# Patient Record
Sex: FEMALE | Race: WHITE | NOT HISPANIC OR LATINO | Employment: OTHER | URBAN - METROPOLITAN AREA
[De-identification: names, ages, dates, MRNs, and addresses within clinical notes are randomized per-mention and may not be internally consistent; named-entity substitution may affect disease eponyms.]

---

## 2017-04-28 DIAGNOSIS — Z00.00 ROUTINE GENERAL MEDICAL EXAMINATION AT A HEALTH CARE FACILITY: Primary | ICD-10-CM

## 2017-07-06 ENCOUNTER — PATIENT OUTREACH (OUTPATIENT)
Dept: ADMINISTRATIVE | Facility: HOSPITAL | Age: 62
End: 2017-07-06

## 2017-07-06 NOTE — LETTER
July 6, 2017    Sidra Matamoros  [id number] [place] [address]             [facility]  [address]  [place] [address]  Phone: [phone] Dear Mrs. Matamoros:    [facility] is committed to your overall health.  To help you get the most out of each of your visits, we will review your information to make sure you are up to date on all of your recommended tests and/or procedures.      Ross Davis MD has found that you may be due for:   Health Maintenance Due   Topic    DEXA Scan and    Hemoglobin A1c     Eye Exam and Foot Exam    PAP Smear    Lipid Panel      Mammogram      If you have had any of the above done at another facility, please bring the records or information with you so that your record at [facility] will be complete.  If you are currently taking medication, please bring it with you to your appointment for review.  We will be happy to assist you with scheduling any necessary appointments or you may contact the [facility] appointment desk at [phone] to schedule at your convenience.   Thank you for choosing [facility] for your healthcare needs,    If you have any questions or concerns, please don't hesitate to call.    Sincerely,  Ginger BURGER LPN  Care Coordination Department  [facility]

## 2017-07-20 ENCOUNTER — CLINICAL SUPPORT (OUTPATIENT)
Dept: INTERNAL MEDICINE | Facility: CLINIC | Age: 62
End: 2017-07-20

## 2017-07-20 ENCOUNTER — OFFICE VISIT (OUTPATIENT)
Dept: INTERNAL MEDICINE | Facility: CLINIC | Age: 62
End: 2017-07-20
Payer: COMMERCIAL

## 2017-07-20 ENCOUNTER — CLINICAL SUPPORT (OUTPATIENT)
Dept: CARDIOLOGY | Facility: CLINIC | Age: 62
End: 2017-07-20
Payer: COMMERCIAL

## 2017-07-20 VITALS
RESPIRATION RATE: 16 BRPM | DIASTOLIC BLOOD PRESSURE: 68 MMHG | HEART RATE: 76 BPM | HEIGHT: 68 IN | RESPIRATION RATE: 16 BRPM | TEMPERATURE: 97 F | BODY MASS INDEX: 25.07 KG/M2 | HEART RATE: 76 BPM | DIASTOLIC BLOOD PRESSURE: 68 MMHG | BODY MASS INDEX: 25.07 KG/M2 | WEIGHT: 165.38 LBS | SYSTOLIC BLOOD PRESSURE: 112 MMHG | WEIGHT: 165.38 LBS | HEIGHT: 68 IN | OXYGEN SATURATION: 99 % | SYSTOLIC BLOOD PRESSURE: 112 MMHG

## 2017-07-20 DIAGNOSIS — Z00.00 ROUTINE GENERAL MEDICAL EXAMINATION AT A HEALTH CARE FACILITY: ICD-10-CM

## 2017-07-20 DIAGNOSIS — E78.5 HYPERLIPIDEMIA, UNSPECIFIED HYPERLIPIDEMIA TYPE: ICD-10-CM

## 2017-07-20 DIAGNOSIS — E11.9 TYPE 2 DIABETES MELLITUS WITHOUT COMPLICATION, WITHOUT LONG-TERM CURRENT USE OF INSULIN: ICD-10-CM

## 2017-07-20 DIAGNOSIS — Z00.00 ROUTINE GENERAL MEDICAL EXAMINATION AT A HEALTH CARE FACILITY: Primary | ICD-10-CM

## 2017-07-20 DIAGNOSIS — F32.A ANXIETY AND DEPRESSION: ICD-10-CM

## 2017-07-20 DIAGNOSIS — I10 ESSENTIAL HYPERTENSION: ICD-10-CM

## 2017-07-20 DIAGNOSIS — F33.2 MAJOR DEPRESSIVE DISORDER, RECURRENT, SEVERE WITHOUT PSYCHOTIC FEATURES: ICD-10-CM

## 2017-07-20 DIAGNOSIS — I34.1 MVP (MITRAL VALVE PROLAPSE): ICD-10-CM

## 2017-07-20 DIAGNOSIS — F41.9 ANXIETY AND DEPRESSION: ICD-10-CM

## 2017-07-20 LAB
ALBUMIN SERPL BCP-MCNC: 3.9 G/DL
ALP SERPL-CCNC: 43 U/L
ALT SERPL W/O P-5'-P-CCNC: 22 U/L
ANION GAP SERPL CALC-SCNC: 10 MMOL/L
AST SERPL-CCNC: 20 U/L
BILIRUB SERPL-MCNC: 0.4 MG/DL
BUN SERPL-MCNC: 20 MG/DL
CALCIUM SERPL-MCNC: 10.4 MG/DL
CHLORIDE SERPL-SCNC: 107 MMOL/L
CHOLEST/HDLC SERPL: 3 {RATIO}
CO2 SERPL-SCNC: 27 MMOL/L
CREAT SERPL-MCNC: 0.9 MG/DL
ERYTHROCYTE [DISTWIDTH] IN BLOOD BY AUTOMATED COUNT: 13.3 %
EST. GFR  (AFRICAN AMERICAN): >60 ML/MIN/1.73 M^2
EST. GFR  (NON AFRICAN AMERICAN): >60 ML/MIN/1.73 M^2
GLUCOSE SERPL-MCNC: 105 MG/DL
HCT VFR BLD AUTO: 36.8 %
HDL/CHOLESTEROL RATIO: 33.3 %
HDLC SERPL-MCNC: 153 MG/DL
HDLC SERPL-MCNC: 51 MG/DL
HGB BLD-MCNC: 11.9 G/DL
LDLC SERPL CALC-MCNC: 79.8 MG/DL
MCH RBC QN AUTO: 26.6 PG
MCHC RBC AUTO-ENTMCNC: 32.3 G/DL
MCV RBC AUTO: 82 FL
NONHDLC SERPL-MCNC: 102 MG/DL
PLATELET # BLD AUTO: 303 K/UL
PMV BLD AUTO: 9.4 FL
POTASSIUM SERPL-SCNC: 4.7 MMOL/L
PROT SERPL-MCNC: 7 G/DL
RBC # BLD AUTO: 4.48 M/UL
SODIUM SERPL-SCNC: 144 MMOL/L
TRIGL SERPL-MCNC: 111 MG/DL
TSH SERPL DL<=0.005 MIU/L-ACNC: 1.38 UIU/ML
WBC # BLD AUTO: 6.02 K/UL

## 2017-07-20 PROCEDURE — 99999 PR PBB SHADOW E&M-EST. PATIENT-LVL III: CPT | Mod: PBBFAC,,, | Performed by: INTERNAL MEDICINE

## 2017-07-20 PROCEDURE — 99386 PREV VISIT NEW AGE 40-64: CPT | Mod: S$GLB,,, | Performed by: INTERNAL MEDICINE

## 2017-07-20 PROCEDURE — 84443 ASSAY THYROID STIM HORMONE: CPT

## 2017-07-20 PROCEDURE — 80053 COMPREHEN METABOLIC PANEL: CPT | Mod: PO

## 2017-07-20 PROCEDURE — 93000 ELECTROCARDIOGRAM COMPLETE: CPT | Mod: S$GLB,,, | Performed by: NUCLEAR MEDICINE

## 2017-07-20 PROCEDURE — 83036 HEMOGLOBIN GLYCOSYLATED A1C: CPT

## 2017-07-20 PROCEDURE — 80061 LIPID PANEL: CPT | Mod: PO

## 2017-07-20 PROCEDURE — 85027 COMPLETE CBC AUTOMATED: CPT | Mod: PO

## 2017-07-20 RX ORDER — ACYCLOVIR 400 MG/1
TABLET ORAL
COMMUNITY
Start: 2017-05-23

## 2017-07-20 RX ORDER — FLUCONAZOLE 150 MG/1
150 TABLET ORAL
COMMUNITY
Start: 2017-06-19 | End: 2017-07-20

## 2017-07-20 RX ORDER — ESTRADIOL 0.03 MG/D
1 FILM, EXTENDED RELEASE TRANSDERMAL WEEKLY
COMMUNITY
End: 2019-03-27

## 2017-07-20 NOTE — PROGRESS NOTES
Subjective:      Patient ID: Sidra Matamoros is a 61 y.o. female.    Chief Complaint: Executive Health    It was a pleasure to meet you for your  Executive Health Physical on 17.      PAST MEDICAL HISTORY:  Osteoarthritis of knees, mitral  valve prolapse, microscopic hematuria with negative  workup, insomnia, hypertension, hyperlipidemia,  diabetes type 2, colon polyps, anxiety, and depression.    Denied smoking, alcohol  Retired from [employer],  with two  Healthy children.      Your primary care  physician is Dr. Song Butt.     Dr. Armando Hood, endocrinologist.  lipidologist [facility].          Medications    acyclovir (ZOVIRAX) 400 MG tablet        duloxetine (CYMBALTA) 30 MG capsule 30 mg, Daily       estradiol (VIVELLE-DOT) 0.025 mg/24 hr 1 patch, Weekly       ezetimibe (ZETIA) 10 mg tablet 10 mg, Daily       fenofibrate micronized (ANTARA) 130 MG capsule 130 mg, With breakfast       fluconazole (DIFLUCAN) 150 MG Tab 150 mg       lisinopril (PRINIVIL,ZESTRIL) 20 MG tablet 20 mg, Daily       metformin (GLUCOPHAGE-XR) 500 MG 24 hr tablet 500 mg, Daily       trazodone (DESYREL) 50 MG tablet 50 mg, Nightly       Review of patient's allergies indicates:   -- Niacin preparations -- Other (See Comments)    --  Skipping heart beat   -- Azithromycin -- Other (See Comments)    --  Hallicunations   -- Seldane -- Other (See Comments)    --  Hallucinations   -- Statins-hmg-coa reductase inhibitors      SURGERIES:  Include total abdominal hysterectomy for  endometriosis, sinus surgery, right middle finger  surgery, hemorrhoid surgery, D and C of uterus, right  carpal tunnel release, and appendectomy.     FAMILY HISTORY:   Mom  with alzheimer.   Father had history  of diabetes, heart disease, hyperlipidemia, and  hypertension.  Sister with history of diabetes and  breast cancer at age 56 and another sister has COPD.   Brother  at age 65 from colon cancer.     Prevent:   Prevent:  "last colonoscopy 3 years ago in [place] with Dr. Horner and will check when due for repeat.  Reported up to date with gyn/mmg. Declined shingles and pneumonia vaccines.          Review of Systems   Constitutional: Negative for chills and fever.   HENT: Negative for ear pain and sore throat.    Respiratory: Negative for cough.    Cardiovascular: Negative for chest pain.   Gastrointestinal: Negative for abdominal pain.     Objective:   /68 (BP Location: Right arm, Patient Position: Sitting)   Pulse 76   Temp 97.3 °F (36.3 °C) (Tympanic)   Resp 16   Ht 5' 8" (1.727 m)   Wt 75 kg (165 lb 5.5 oz)   SpO2 99%   BMI 25.14 kg/m²     Physical Exam   Constitutional: She is oriented to person, place, and time. She appears well-developed and well-nourished. No distress.   HENT:   Head: Normocephalic and atraumatic.   Mouth/Throat: Oropharynx is clear and moist.   Eyes: EOM are normal. Pupils are equal, round, and reactive to light.   Neck: Neck supple. No thyromegaly present.   Cardiovascular: Normal rate and regular rhythm.    Pulmonary/Chest: Breath sounds normal. She has no wheezes. She has no rales.   Abdominal: There is no tenderness.   Lymphadenopathy:     She has no cervical adenopathy.   Neurological: She is alert and oriented to person, place, and time.   Skin: Skin is warm and dry.   Psychiatric: She has a normal mood and affect. Her behavior is normal.     Clinical Support on 07/20/2017   Component Date Value Ref Range Status    Sodium 07/20/2017 144  136 - 145 mmol/L Final    Potassium 07/20/2017 4.7  3.5 - 5.1 mmol/L Final    Chloride 07/20/2017 107  95 - 110 mmol/L Final    CO2 07/20/2017 27  23 - 29 mmol/L Final    Glucose 07/20/2017 105  70 - 110 mg/dL Final    BUN, Bld 07/20/2017 20  6 - 20 mg/dL Final    Creatinine 07/20/2017 0.9  0.5 - 1.4 mg/dL Final    Calcium 07/20/2017 10.4  8.7 - 10.5 mg/dL Final    Total Protein 07/20/2017 7.0  6.0 - 8.4 g/dL Final    Albumin 07/20/2017 3.9  3.5 - " 5.2 g/dL Final    Total Bilirubin 07/20/2017 0.4  0.1 - 1.0 mg/dL Final    Comment: For infants and newborns, interpretation of results should be based  on gestational age, weight and in agreement with clinical  observations.  Premature Infant recommended reference ranges:  Up to 24 hours.............<8.0 mg/dL  Up to 48 hours............<12.0 mg/dL  3-5 days..................<15.0 mg/dL  6-29 days.................<15.0 mg/dL      Alkaline Phosphatase 07/20/2017 43* 55 - 135 U/L Final    AST 07/20/2017 20  10 - 40 U/L Final    ALT 07/20/2017 22  10 - 44 U/L Final    Anion Gap 07/20/2017 10  8 - 16 mmol/L Final    eGFR if  07/20/2017 >60  >60 mL/min/1.73 m^2 Final    eGFR if non African American 07/20/2017 >60  >60 mL/min/1.73 m^2 Final    Comment: Calculation used to obtain the estimated glomerular filtration  rate (eGFR) is the CKD-EPI equation. Since race is unknown   in our information system, the eGFR values for   -American and Non--American patients are given   for each creatinine result.      WBC 07/20/2017 6.02  3.90 - 12.70 K/uL Final    RBC 07/20/2017 4.48  4.00 - 5.40 M/uL Final    Hemoglobin 07/20/2017 11.9* 12.0 - 16.0 g/dL Final    Hematocrit 07/20/2017 36.8* 37.0 - 48.5 % Final    MCV 07/20/2017 82  82 - 98 fL Final    MCH 07/20/2017 26.6* 27.0 - 31.0 pg Final    MCHC 07/20/2017 32.3  32.0 - 36.0 g/dL Final    RDW 07/20/2017 13.3  11.5 - 14.5 % Final    Platelets 07/20/2017 303  150 - 350 K/uL Final    MPV 07/20/2017 9.4  9.2 - 12.9 fL Final    Cholesterol 07/20/2017 153  120 - 199 mg/dL Final    Comment: The National Cholesterol Education Program (NCEP) has set the  following guidelines (reference ranges) for Cholesterol:  Optimal.....................<200 mg/dL  Borderline High.............200-239 mg/dL  High........................> or = 240 mg/dL      Triglycerides 07/20/2017 111  30 - 150 mg/dL Final    Comment: The National Cholesterol Education  Program (NCEP) has set the  following guidelines (reference values) for triglycerides:  Normal......................<150 mg/dL  Borderline High.............150-199 mg/dL  High........................200-499 mg/dL      HDL 07/20/2017 51  40 - 75 mg/dL Final    Comment: The National Cholesterol Education Program (NCEP) has set the  following guidelines (reference values) for HDL Cholesterol:  Low...............<40 mg/dL  Optimal...........>60 mg/dL      LDL Cholesterol 07/20/2017 79.8  63.0 - 159.0 mg/dL Final    Comment: The National Cholesterol Education Program (NCEP) has set the  following guidelines (reference values) for LDL Cholesterol:  Optimal.......................<130 mg/dL  Borderline High...............130-159 mg/dL  High..........................160-189 mg/dL  Very High.....................>190 mg/dL      HDL/Chol Ratio 07/20/2017 33.3  20.0 - 50.0 % Final    Total Cholesterol/HDL Ratio 07/20/2017 3.0  2.0 - 5.0 Final    Non-HDL Cholesterol 07/20/2017 102  mg/dL Final    Comment: Risk category and Non-HDL cholesterol goals:  Coronary heart disease (CHD)or equivalent (10-year risk of CHD >20%):  Non-HDL cholesterol goal     <130 mg/dL  Two or more CHD risk factors and 10-year risk of CHD <= 20%:  Non-HDL cholesterol goal     <160 mg/dL  0 to 1 CHD risk factor:  Non-HDL cholesterol goal     <190 mg/dL         Assessment:     1. Routine general medical examination at a health care facility    2. Type 2 diabetes mellitus without complication, without long-term current use of insulin    3. Essential hypertension    4. Hyperlipidemia, unspecified hyperlipidemia type    5. Anxiety and depression    6. MVP (mitral valve prolapse)    7. Major depressive disorder, recurrent, severe without psychotic features      Plan:   Routine general medical examination at a health care facility    Type 2 diabetes mellitus without complication, without long-term current use of insulin    Essential  hypertension    Hyperlipidemia, unspecified hyperlipidemia type    Anxiety and depression    MVP (mitral valve prolapse)    Major depressive disorder, recurrent, severe without psychotic features    Heart healthy diet and reg exercise  HM reviewed    See exec health letter for further details.    Lab Frequency Next Occurrence   Mammo Digital Screening Bilat with Tomosynthesis CAD Once 04/28/2017         No Follow-up on file.

## 2017-07-21 LAB
ESTIMATED AVG GLUCOSE: 128 MG/DL
HBA1C MFR BLD HPLC: 6.1 %

## 2019-02-25 DIAGNOSIS — Z00.00 ROUTINE GENERAL MEDICAL EXAMINATION AT A HEALTH CARE FACILITY: Primary | ICD-10-CM

## 2019-03-27 ENCOUNTER — APPOINTMENT (OUTPATIENT)
Dept: RADIOLOGY | Facility: HOSPITAL | Age: 64
End: 2019-03-27
Payer: COMMERCIAL

## 2019-03-27 ENCOUNTER — CLINICAL SUPPORT (OUTPATIENT)
Dept: INTERNAL MEDICINE | Facility: CLINIC | Age: 64
End: 2019-03-27
Payer: COMMERCIAL

## 2019-03-27 ENCOUNTER — OFFICE VISIT (OUTPATIENT)
Dept: INTERNAL MEDICINE | Facility: CLINIC | Age: 64
End: 2019-03-27
Payer: COMMERCIAL

## 2019-03-27 ENCOUNTER — CLINICAL SUPPORT (OUTPATIENT)
Dept: REHABILITATION | Facility: HOSPITAL | Age: 64
End: 2019-03-27
Payer: COMMERCIAL

## 2019-03-27 VITALS
BODY MASS INDEX: 26.16 KG/M2 | RESPIRATION RATE: 16 BRPM | HEIGHT: 68 IN | SYSTOLIC BLOOD PRESSURE: 132 MMHG | WEIGHT: 172.63 LBS | HEART RATE: 80 BPM | DIASTOLIC BLOOD PRESSURE: 78 MMHG

## 2019-03-27 VITALS
SYSTOLIC BLOOD PRESSURE: 132 MMHG | HEART RATE: 80 BPM | WEIGHT: 171.94 LBS | BODY MASS INDEX: 26.06 KG/M2 | DIASTOLIC BLOOD PRESSURE: 78 MMHG | TEMPERATURE: 98 F | RESPIRATION RATE: 16 BRPM | HEIGHT: 68 IN

## 2019-03-27 DIAGNOSIS — F41.9 ANXIETY AND DEPRESSION: ICD-10-CM

## 2019-03-27 DIAGNOSIS — Z00.00 ROUTINE GENERAL MEDICAL EXAMINATION AT A HEALTH CARE FACILITY: Primary | ICD-10-CM

## 2019-03-27 DIAGNOSIS — J32.9 CHRONIC SINUSITIS, UNSPECIFIED LOCATION: ICD-10-CM

## 2019-03-27 DIAGNOSIS — E11.9 TYPE 2 DIABETES MELLITUS WITHOUT COMPLICATION, WITHOUT LONG-TERM CURRENT USE OF INSULIN: ICD-10-CM

## 2019-03-27 DIAGNOSIS — E78.2 MIXED HYPERLIPIDEMIA: ICD-10-CM

## 2019-03-27 DIAGNOSIS — I10 ESSENTIAL HYPERTENSION: ICD-10-CM

## 2019-03-27 DIAGNOSIS — E83.52 HYPERCALCEMIA: ICD-10-CM

## 2019-03-27 DIAGNOSIS — Z78.9 STATIN INTOLERANCE: ICD-10-CM

## 2019-03-27 DIAGNOSIS — Z00.00 ROUTINE GENERAL MEDICAL EXAMINATION AT A HEALTH CARE FACILITY: ICD-10-CM

## 2019-03-27 DIAGNOSIS — F32.A ANXIETY AND DEPRESSION: ICD-10-CM

## 2019-03-27 LAB
25(OH)D3+25(OH)D2 SERPL-MCNC: 24 NG/ML (ref 30–96)
ALBUMIN SERPL BCP-MCNC: 4.1 G/DL (ref 3.5–5.2)
ALP SERPL-CCNC: 50 U/L (ref 55–135)
ALT SERPL W/O P-5'-P-CCNC: 33 U/L (ref 10–44)
ANION GAP SERPL CALC-SCNC: 12 MMOL/L (ref 8–16)
AST SERPL-CCNC: 24 U/L (ref 10–40)
BILIRUB SERPL-MCNC: 0.3 MG/DL (ref 0.1–1)
BILIRUB UR QL STRIP: NEGATIVE
BUN SERPL-MCNC: 17 MG/DL (ref 8–23)
CALCIUM SERPL-MCNC: 10.9 MG/DL (ref 8.7–10.5)
CHLORIDE SERPL-SCNC: 107 MMOL/L (ref 95–110)
CHOLEST SERPL-MCNC: 141 MG/DL (ref 120–199)
CHOLEST/HDLC SERPL: 3.1 {RATIO} (ref 2–5)
CLARITY UR: CLEAR
CO2 SERPL-SCNC: 25 MMOL/L (ref 23–29)
COLOR UR: YELLOW
CREAT SERPL-MCNC: 0.8 MG/DL (ref 0.5–1.4)
ERYTHROCYTE [DISTWIDTH] IN BLOOD BY AUTOMATED COUNT: 13.2 % (ref 11.5–14.5)
EST. GFR  (AFRICAN AMERICAN): >60 ML/MIN/1.73 M^2
EST. GFR  (NON AFRICAN AMERICAN): >60 ML/MIN/1.73 M^2
ESTIMATED AVG GLUCOSE: 146 MG/DL (ref 68–131)
GLUCOSE SERPL-MCNC: 110 MG/DL (ref 70–110)
GLUCOSE UR QL STRIP: NEGATIVE
HBA1C MFR BLD HPLC: 6.7 % (ref 4–5.6)
HCT VFR BLD AUTO: 37.9 % (ref 37–48.5)
HDLC SERPL-MCNC: 45 MG/DL (ref 40–75)
HDLC SERPL: 31.9 % (ref 20–50)
HGB BLD-MCNC: 11.8 G/DL (ref 12–16)
HGB UR QL STRIP: NEGATIVE
KETONES UR QL STRIP: NEGATIVE
LDLC SERPL CALC-MCNC: 73.8 MG/DL (ref 63–159)
LEUKOCYTE ESTERASE UR QL STRIP: NEGATIVE
MCH RBC QN AUTO: 25.8 PG (ref 27–31)
MCHC RBC AUTO-ENTMCNC: 31.1 G/DL (ref 32–36)
MCV RBC AUTO: 83 FL (ref 82–98)
NITRITE UR QL STRIP: NEGATIVE
NONHDLC SERPL-MCNC: 96 MG/DL
PH UR STRIP: 5 [PH] (ref 5–8)
PLATELET # BLD AUTO: 370 K/UL (ref 150–350)
PMV BLD AUTO: 10 FL (ref 9.2–12.9)
POTASSIUM SERPL-SCNC: 4 MMOL/L (ref 3.5–5.1)
PROT SERPL-MCNC: 7.1 G/DL (ref 6–8.4)
PROT UR QL STRIP: NEGATIVE
RBC # BLD AUTO: 4.58 M/UL (ref 4–5.4)
SODIUM SERPL-SCNC: 144 MMOL/L (ref 136–145)
SP GR UR STRIP: >=1.03 (ref 1–1.03)
TRIGL SERPL-MCNC: 111 MG/DL (ref 30–150)
TSH SERPL DL<=0.005 MIU/L-ACNC: 3.07 UIU/ML (ref 0.4–4)
URN SPEC COLLECT METH UR: ABNORMAL
WBC # BLD AUTO: 6.85 K/UL (ref 3.9–12.7)

## 2019-03-27 PROCEDURE — 99999 PR PBB SHADOW E&M-EST. PATIENT-LVL III: CPT | Mod: PBBFAC,,, | Performed by: FAMILY MEDICINE

## 2019-03-27 PROCEDURE — 80061 LIPID PANEL: CPT

## 2019-03-27 PROCEDURE — 82306 VITAMIN D 25 HYDROXY: CPT

## 2019-03-27 PROCEDURE — 99386 PR PREVENTIVE VISIT,NEW,40-64: ICD-10-PCS | Mod: S$PBB,,, | Performed by: FAMILY MEDICINE

## 2019-03-27 PROCEDURE — 84443 ASSAY THYROID STIM HORMONE: CPT

## 2019-03-27 PROCEDURE — 77080 DEXA BONE DENSITY SPINE HIP: ICD-10-PCS | Mod: 26,,, | Performed by: RADIOLOGY

## 2019-03-27 PROCEDURE — 85027 COMPLETE CBC AUTOMATED: CPT

## 2019-03-27 PROCEDURE — 97750 PHYSICAL PERFORMANCE TEST: CPT | Performed by: PHYSICAL THERAPIST

## 2019-03-27 PROCEDURE — 80053 COMPREHEN METABOLIC PANEL: CPT

## 2019-03-27 PROCEDURE — 77080 DXA BONE DENSITY AXIAL: CPT | Mod: 26,,, | Performed by: RADIOLOGY

## 2019-03-27 PROCEDURE — 99386 PREV VISIT NEW AGE 40-64: CPT | Mod: S$PBB,,, | Performed by: FAMILY MEDICINE

## 2019-03-27 PROCEDURE — 77080 DXA BONE DENSITY AXIAL: CPT | Mod: TC

## 2019-03-27 PROCEDURE — 83036 HEMOGLOBIN GLYCOSYLATED A1C: CPT

## 2019-03-27 PROCEDURE — 81003 URINALYSIS AUTO W/O SCOPE: CPT

## 2019-03-27 PROCEDURE — 99999 PR PBB SHADOW E&M-EST. PATIENT-LVL III: ICD-10-PCS | Mod: PBBFAC,,, | Performed by: FAMILY MEDICINE

## 2019-03-27 NOTE — PROGRESS NOTES
"Subjective:       Patient ID: Sidra Matamoros is a 63 y.o. female.    Chief Complaint: Executive Health    63-year-old female patient with Patient Active Problem List:     Diabetes mellitus, type 2     Hypertension     Hyperlipidemia     Anxiety and depression     MVP (mitral valve prolapse)     Major depressive disorder, recurrent, severe without psychotic features     Chronic sinusitis     Statin intolerance  Here for executive health physical for [employer].  Patient reports that she has been taking her medications regularly and sugars have been mildly elevated for which metformin has been increased from 500-1000 mg twice daily.   Patient occasionally has tingling sensation to the feet, but has been followed by her diabetes specialist.   Anxiety and depression has been stable on Cymbalta  Could not tolerate statins  Reports her blood pressures have always been stable.   Has been exercising 2 days a week for an hour    Review of Systems   Constitutional: Negative for fatigue.   Eyes: Negative for visual disturbance.   Respiratory: Negative for shortness of breath.    Cardiovascular: Negative for chest pain and leg swelling.   Gastrointestinal: Negative for abdominal pain, nausea and vomiting.   Endocrine: Negative for polydipsia, polyphagia and polyuria.   Musculoskeletal: Negative for myalgias.   Skin: Negative for rash.   Neurological: Positive for numbness. Negative for weakness, light-headedness and headaches.   Psychiatric/Behavioral: Positive for dysphoric mood. Negative for sleep disturbance. The patient is nervous/anxious.          /78   Pulse 80   Temp 98.4 °F (36.9 °C) (Tympanic)   Resp 16   Ht 5' 8" (1.727 m)   Wt 78 kg (171 lb 15.3 oz)   BMI 26.15 kg/m²   Objective:      Physical Exam   Constitutional: She is oriented to person, place, and time. She appears well-developed and well-nourished.   HENT:   Head: Normocephalic and atraumatic.   Mouth/Throat: Oropharynx is clear and moist. "   Cardiovascular: Normal rate, regular rhythm and normal heart sounds.   No murmur heard.  Pulmonary/Chest: Effort normal and breath sounds normal. She has no wheezes.   Abdominal: Soft. Bowel sounds are normal. There is no tenderness.   Musculoskeletal: She exhibits no edema or tenderness.   Neurological: She is alert and oriented to person, place, and time.   Skin: Skin is warm and dry. No rash noted.   Psychiatric: She has a normal mood and affect.       Clinical Support on 03/27/2019   Component Date Value Ref Range Status    Sodium 03/27/2019 144  136 - 145 mmol/L Final    Potassium 03/27/2019 4.0  3.5 - 5.1 mmol/L Final    Chloride 03/27/2019 107  95 - 110 mmol/L Final    CO2 03/27/2019 25  23 - 29 mmol/L Final    Glucose 03/27/2019 110  70 - 110 mg/dL Final    BUN, Bld 03/27/2019 17  8 - 23 mg/dL Final    Creatinine 03/27/2019 0.8  0.5 - 1.4 mg/dL Final    Calcium 03/27/2019 10.9* 8.7 - 10.5 mg/dL Final    Total Protein 03/27/2019 7.1  6.0 - 8.4 g/dL Final    Albumin 03/27/2019 4.1  3.5 - 5.2 g/dL Final    Total Bilirubin 03/27/2019 0.3  0.1 - 1.0 mg/dL Final    Comment: For infants and newborns, interpretation of results should be based  on gestational age, weight and in agreement with clinical  observations.  Premature Infant recommended reference ranges:  Up to 24 hours.............<8.0 mg/dL  Up to 48 hours............<12.0 mg/dL  3-5 days..................<15.0 mg/dL  6-29 days.................<15.0 mg/dL      Alkaline Phosphatase 03/27/2019 50* 55 - 135 U/L Final    AST 03/27/2019 24  10 - 40 U/L Final    ALT 03/27/2019 33  10 - 44 U/L Final    Anion Gap 03/27/2019 12  8 - 16 mmol/L Final    eGFR if African American 03/27/2019 >60  >60 mL/min/1.73 m^2 Final    eGFR if non African American 03/27/2019 >60  >60 mL/min/1.73 m^2 Final    Comment: Calculation used to obtain the estimated glomerular filtration  rate (eGFR) is the CKD-EPI equation.       WBC 03/27/2019 6.85  3.90 - 12.70 K/uL  Final    RBC 03/27/2019 4.58  4.00 - 5.40 M/uL Final    Hemoglobin 03/27/2019 11.8* 12.0 - 16.0 g/dL Final    Hematocrit 03/27/2019 37.9  37.0 - 48.5 % Final    MCV 03/27/2019 83  82 - 98 fL Final    MCH 03/27/2019 25.8* 27.0 - 31.0 pg Final    MCHC 03/27/2019 31.1* 32.0 - 36.0 g/dL Final    RDW 03/27/2019 13.2  11.5 - 14.5 % Final    Platelets 03/27/2019 370* 150 - 350 K/uL Final    MPV 03/27/2019 10.0  9.2 - 12.9 fL Final    Cholesterol 03/27/2019 141  120 - 199 mg/dL Final    Comment: The National Cholesterol Education Program (NCEP) has set the  following guidelines (reference ranges) for Cholesterol:  Optimal.....................<200 mg/dL  Borderline High.............200-239 mg/dL  High........................> or = 240 mg/dL      Triglycerides 03/27/2019 111  30 - 150 mg/dL Final    Comment: The National Cholesterol Education Program (NCEP) has set the  following guidelines (reference values) for triglycerides:  Normal......................<150 mg/dL  Borderline High.............150-199 mg/dL  High........................200-499 mg/dL      HDL 03/27/2019 45  40 - 75 mg/dL Final    Comment: The National Cholesterol Education Program (NCEP) has set the  following guidelines (reference values) for HDL Cholesterol:  Low...............<40 mg/dL  Optimal...........>60 mg/dL      LDL Cholesterol 03/27/2019 73.8  63.0 - 159.0 mg/dL Final    Comment: The National Cholesterol Education Program (NCEP) has set the  following guidelines (reference values) for LDL Cholesterol:  Optimal.......................<130 mg/dL  Borderline High...............130-159 mg/dL  High..........................160-189 mg/dL  Very High.....................>190 mg/dL      HDL/Chol Ratio 03/27/2019 31.9  20.0 - 50.0 % Final    Total Cholesterol/HDL Ratio 03/27/2019 3.1  2.0 - 5.0 Final    Non-HDL Cholesterol 03/27/2019 96  mg/dL Final    Comment: Risk category and Non-HDL cholesterol goals:  Coronary heart disease (CHD)or  equivalent (10-year risk of CHD >20%):  Non-HDL cholesterol goal     <130 mg/dL  Two or more CHD risk factors and 10-year risk of CHD <= 20%:  Non-HDL cholesterol goal     <160 mg/dL  0 to 1 CHD risk factor:  Non-HDL cholesterol goal     <190 mg/dL      Specimen UA 03/27/2019 Urine, Clean Catch   Final    Color, UA 03/27/2019 Yellow  Yellow, Straw, Shyla Final    Appearance, UA 03/27/2019 Clear  Clear Final    pH, UA 03/27/2019 5.0  5.0 - 8.0 Final    Specific Gravity, UA 03/27/2019 >=1.030* 1.005 - 1.030 Final    Protein, UA 03/27/2019 Negative  Negative Final    Comment: Recommend a 24 hour urine protein or a urine   protein/creatinine ratio if globulin induced proteinuria is  clinically suspected.      Glucose, UA 03/27/2019 Negative  Negative Final    Ketones, UA 03/27/2019 Negative  Negative Final    Bilirubin (UA) 03/27/2019 Negative  Negative Final    Occult Blood UA 03/27/2019 Negative  Negative Final    Nitrite, UA 03/27/2019 Negative  Negative Final    Leukocytes, UA 03/27/2019 Negative  Negative Final         Assessment:       1. Routine general medical examination at a health care facility    2. Essential hypertension    3. Mixed hyperlipidemia    4. Statin intolerance    5. Type 2 diabetes mellitus without complication, without long-term current use of insulin    6. Anxiety and depression    7. Chronic sinusitis, unspecified location    8. Hypercalcemia        Plan:   Routine general medical examination at a health care facility  Vital signs stable today.  Clinical exam stable.  Encouraged to work on lifestyle modifications with low-fat and low-cholesterol diet and exercise 30 min daily  Reviewed recent labs showing mild anemia.  Encouraged to eat protein rich diet  Noted elevated calcium levels, reports the patient has been drinking Lactaid milk with calcium, and will try drinking milk without calcium, and will follow up with her PCP, clinically asymptomatic    Essential hypertension  Blood  pressure is stable today currently on lisinopril 20 mg  Patient was offered about hypertension digital program, refused at this time and reports that her blood pressures have always been stable    Mixed hyperlipidemia  Statin intolerance  Currently on Zetia 10 mg and fenofibrate 130 mg  Could not tolerate statins in the past    Type 2 diabetes mellitus without complication, without long-term current use of insulin  Currently taking metformin 1000 mg twice daily  Patient was advised to follow up with eye physician and foot specialist regularly    Anxiety and depression  Stable on Cymbalta    Chronic sinusitis, unspecified location

## 2019-03-27 NOTE — PROGRESS NOTES
:  [birth year]    DX: Z00.0  Orders:  Fitness Evaluation    Patient's 3rd [facility] Fitness Component evaluation was completed.  Results are as follows:    Height (in):    68                            Weight (lbs):    172                                    BMI:   26.2    Resting Energy Expenditure:         1480       kcal/24 hrs.              Estimated:    1394     REE measured post treadmill:          No   REE measured fasting:            No   1/2 granola bar ~ 1 hour prior to test    Body Composition:          37.09  % body fat             Rating: Fair    Waist to Hip Ratio:     1.08                 Risk:  High   Hip taken over clothing:      Yes          Muscular Strength and Endurance Assessment:               Strength (lbs):     Right: 62.7             Rating:  average      Left:  51           Rating: average   Push-ups:   3                 Rating:  fair   Curl-ups :   21                Rating:  average    Flexibility Testing:   Sit and Reach (cm):    20                       Rating:  Fair    Patient reports not as active as she used to be. Weight has increased.  H/O C-spine disc protrusion.  Having some occasional discomfort LB.      The patient tolerated the testing procedures well.

## 2019-03-27 NOTE — PROGRESS NOTES
"Nutrition Assessment  Client name:  Sidra Matamoros  :  1955  Age:  63 y.o.  Gender:  female    Client states:  Very pleasant retired client here today for [employer] physical. Client is ,  also retired but they stay busy with grandchildrens' schedules. Client reported medical history of diabetes, hypertension, depression and history of hyperlipidemia all for which she takes medications. She stated she had a hgb A1c drawn 2 weeks ago with another provider and it was 7.2. She reported that she discussed this result with the provider and diabetes medication was increased. She admits to limited adherence to medications stating that she frequently forgets to take prescribed medications especially when she is away from home. She reported approximately a 15 lb weight gain over the past 3 years. Client describes herself as a couch potato mostly but goes to a senior aerobics class twice weekly. Frequent dining out especially at lunch and 1-2 20 oz Dr peppers per day with limited water intake. She states that she does not eat a lot of sweets but loves rice and salty snacks such as chips and eats them several times per day. Inquiring about weight loss and diet management for constipation.    Anthropometrics  Height:  5'8"    Weight:  171  BMI:  26.2  % Body Fat:  37.09%    Clinical Signs/Symptoms  N/V/D:  Constipation  Appetite (Good, Fair, or Poor):  Good      Past Medical History:   Diagnosis Date    Anxiety and depression     Colon polyp     Diabetes mellitus, type 2     Hyperlipidemia     intol of mult statins.    Hypertension     Insomnia     Microscopic hematuria     neg w/u.    MVP (mitral valve prolapse)     OA (osteoarthritis) of knee        Past Surgical History:   Procedure Laterality Date    APPENDECTOMY      CARPAL TUNNEL RELEASE Right     DILATION AND CURETTAGE OF UTERUS      Hemorriods surgery      right middle finger surgery      SINUS SURGERY      TOTAL ABDOMINAL " HYSTERECTOMY      endometriosis       Medications    has a current medication list which includes the following prescription(s): acyclovir, duloxetine, ezetimibe, fenofibrate micronized, lisinopril, metformin, and trazodone.    Vitamins, Minerals, and/or Supplements:  None     Food/Medication Interactions:  Reviewed     Food Allergies or Intolerances:  None     Social History    Marital status:    Employment:  Retired    Social History     Tobacco Use    Smoking status: Never Smoker    Smokeless tobacco: Never Used   Substance Use Topics    Alcohol use: No        Lab Reports   Total Cholesterol:  141    Triglycerides:  111  HDL:  45  LDL:  73.8   Glucose:  110  HbA1c:  Results pending  BP:  132/78     Food History  Breakfast:  Cheerios with 2%  milk  Mid-morning Snack: Fritos chips or orange  Lunch:  Firehouse sup with chips, Dr Pepper  Mid-afternoon Snack:  Fruit or chips  Dinner:  Chicken fettuccini javad or jambalaya   H.S. Snack:  chips  *Fluid intake:  1-2 Dr Peppers per day, 1 water bottle (16oz) per day    Exercise History:  Senior aerobics class twice per week    Cultural/Spiritual/Personal Preferences:  None identified    Support System:  Spouse    State of Change:  Contemplation    Barriers to Change:  Lack of motivation    Diagnosis    Overweight related to excessive energy intake and decreased physical activity as evidenced by client stated diet and exercise history and BMI 26.2.    Intervention    RMR (Method:  Body [place]):  1480 kcal  Activity Factor:  1.3  RAFITA:  1924 - 400 = 1524    Goals:  1.  Achieve %5 weight loss in 3 months ( approximately 9 lbs)  2.  Incorporate 1/2 plate vegetables with dinner  3.  Replace 1 Dr Pepper per day with 1-2 bottles of water  4. Gradually increase activity as tolerated by adding steps when shopping or running errands    Nutrition Education  Reviewed lipid panel and CMP; Hgb A1c result pending. Reviewed the benefits and strategies to increase fruit/vegetable  and water intake for both weight management and constipation symptoms. Discussed the plate method, portions and specific areas where more energy-dense foods may be replaced with higher fiber, less energy and carbohydrate-dense foods. Encouraged limiting sugar-sweetened beverages for weight and blood sugar control. Discussed ideas for more balanced, lower sodium snacks instead of frequent intake of chips. Discussed the benefits of physical activity for weight management, constipation and blood sugar regulation and reviewed small changes to add steps into her day. Stressed progress over perfection and small, sustainable habit-based changes.    Patient verbalized understanding of nutrition education and recommendations received.    Handouts Provided  Meal Planning Guide  Restaurant Guide  Eat Fit Shopping List  Eat Fit Aleja  Fast Food Guide  Vitamin/Mineral Guide  Sensational Salads  Plate Method    Monitoring/Evaluation    Monitor the following:  Weight  BMI  % Body Fat  Caloric intake  Labs:  Lipid panel, Hgb A1c, CMP    Follow Up Plan:  Communication with referring healthcare provider is unnecessary at this time as patient presented as part of annual wellness exam.  However, will follow up with patient in 1-2 years.